# Patient Record
Sex: FEMALE | URBAN - METROPOLITAN AREA
[De-identification: names, ages, dates, MRNs, and addresses within clinical notes are randomized per-mention and may not be internally consistent; named-entity substitution may affect disease eponyms.]

---

## 2023-03-08 ENCOUNTER — TELEPHONE (OUTPATIENT)
Dept: FAMILY MEDICINE CLINIC | Facility: CLINIC | Age: 23
End: 2023-03-08

## 2023-03-08 NOTE — TELEPHONE ENCOUNTER
----- Message from Kim Ivy sent at 3/6/2023  3:32 PM EST -----  Subject: Appointment Request    Reason for Call: New Patient/New to Provider Appointment needed: New   Patient Request Appointment    QUESTIONS    Reason for appointment request? No appointments available during search     Additional Information for Provider? Pt would like to schedule a new pt   appointment . No availabilities on our end to schedule.  Please call pt   back and inform if anyone in the office is accepting new pts  ---------------------------------------------------------------------------  --------------  [id number] [employer]  [phone]; OK to leave message on voicemail  ---------------------------------------------------------------------------  --------------  SCRIPT ANSWERS  SRUTHIID Screen: Lacey Abebe

## 2023-03-08 NOTE — TELEPHONE ENCOUNTER
Spoke to family member and have the patient scheduled.      Future Appointments   Date Time Provider Sam Logan   9/18/2023 10:00 AM DO LILLIAN Rush BS [facility]

## 2023-06-22 PROBLEM — Z34.90 ENCOUNTER FOR INDUCTION OF LABOR: Status: ACTIVE | Noted: 2023-06-22

## 2023-06-25 PROBLEM — R03.0 ELEVATED BLOOD PRESSURE READING: Status: ACTIVE | Noted: 2023-06-25

## 2023-07-28 ENCOUNTER — TELEPHONE (OUTPATIENT)
Dept: FAMILY MEDICINE CLINIC | Facility: CLINIC | Age: 23
End: 2023-07-28

## 2023-07-28 NOTE — TELEPHONE ENCOUNTER
LVM informing patient Dr. Catracho Hayes is leaving office and September appointment has been cancelled. Requested a call back if interested in being added to call list for new providers starting in the Fall.